# Patient Record
(demographics unavailable — no encounter records)

---

## 2025-05-18 NOTE — HISTORY OF PRESENT ILLNESS
[Patient reported mammogram was normal] : Patient reported mammogram was normal [Patient reported PAP Smear was normal] : Patient reported PAP Smear was normal [Currently Active] : currently active [Men] : men [Vaginal] : vaginal [No] : No [Patient refuses STI testing] : Patient refuses STI testing [Y] : Positive pregnancy history [TextBox_4] : GY[facility]X No history of fibroids, cysts, or STDs last pap 1/24 wnl [Mammogramdate] : 01/24 [PapSmeardate] : 01/24 [TextBox_43] : jackie  [PGHxTotal] : 5 [[facility]xFullTerm] : 2 [[facility] 2 [PGHxABInduced] : 3 [FreeTextEntry1] : 2nsvd

## 2025-05-18 NOTE — HISTORY OF PRESENT ILLNESS
[Patient reported mammogram was normal] : Patient reported mammogram was normal [Patient reported PAP Smear was normal] : Patient reported PAP Smear was normal [Currently Active] : currently active [Men] : men [Vaginal] : vaginal [No] : No [Patient refuses STI testing] : Patient refuses STI testing [Y] : Positive pregnancy history [TextBox_4] : GYNHX No history of fibroids, cysts, or STDs last pap 1/24 wnl [Mammogramdate] : 01/24 [PapSmeardate] : 01/24 [TextBox_43] : jackie  [PGHxTotal] : 5 [[facility] 2 [[facility] 2 [PGHxABInduced] : 3 [FreeTextEntry1] : 2nsvd

## 2025-05-18 NOTE — PHYSICAL EXAM
[Appropriately responsive] : appropriately responsive [Alert] : alert [No Acute Distress] : no acute distress [No Lymphadenopathy] : no lymphadenopathy [Soft] : soft [Non-tender] : non-tender [Non-distended] : non-distended [No HSM] : No HSM [No Lesions] : no lesions [No Mass] : no mass [Oriented x3] : oriented x3 [Examination Of The Breasts] : a normal appearance [No Discharge] : no discharge [No Masses] : no breast masses were palpable [Labia Majora] : normal [Labia Minora] : normal [Normal] : normal [Uterine Adnexae] : normal [FreeTextEntry6] : wnl